# Patient Record
Sex: FEMALE | Race: WHITE
[De-identification: names, ages, dates, MRNs, and addresses within clinical notes are randomized per-mention and may not be internally consistent; named-entity substitution may affect disease eponyms.]

---

## 2021-05-04 ENCOUNTER — HOSPITAL ENCOUNTER (EMERGENCY)
Dept: HOSPITAL 56 - MW.ED | Age: 76
Discharge: HOME | End: 2021-05-04
Payer: MEDICARE

## 2021-05-04 DIAGNOSIS — Z79.899: ICD-10-CM

## 2021-05-04 DIAGNOSIS — I10: ICD-10-CM

## 2021-05-04 DIAGNOSIS — K57.32: Primary | ICD-10-CM

## 2021-05-04 DIAGNOSIS — Z79.01: ICD-10-CM

## 2021-05-04 DIAGNOSIS — Z79.82: ICD-10-CM

## 2021-05-04 LAB
BUN SERPL-MCNC: 20 MG/DL (ref 7–18)
CHLORIDE SERPL-SCNC: 100 MMOL/L (ref 98–107)
CO2 SERPL-SCNC: 28.2 MMOL/L (ref 21–32)
GLUCOSE SERPL-MCNC: 121 MG/DL (ref 74–106)
LIPASE SERPL-CCNC: 131 U/L (ref 73–393)
POTASSIUM SERPL-SCNC: 3.5 MMOL/L (ref 3.5–5.1)
SODIUM SERPL-SCNC: 139 MMOL/L (ref 136–145)

## 2021-05-04 PROCEDURE — 96374 THER/PROPH/DIAG INJ IV PUSH: CPT

## 2021-05-04 PROCEDURE — 80053 COMPREHEN METABOLIC PANEL: CPT

## 2021-05-04 PROCEDURE — 71045 X-RAY EXAM CHEST 1 VIEW: CPT

## 2021-05-04 PROCEDURE — 83690 ASSAY OF LIPASE: CPT

## 2021-05-04 PROCEDURE — 93005 ELECTROCARDIOGRAM TRACING: CPT

## 2021-05-04 PROCEDURE — 99284 EMERGENCY DEPT VISIT MOD MDM: CPT

## 2021-05-04 PROCEDURE — 84484 ASSAY OF TROPONIN QUANT: CPT

## 2021-05-04 PROCEDURE — 36415 COLL VENOUS BLD VENIPUNCTURE: CPT

## 2021-05-04 PROCEDURE — 74177 CT ABD & PELVIS W/CONTRAST: CPT

## 2021-05-04 PROCEDURE — 85025 COMPLETE CBC W/AUTO DIFF WBC: CPT

## 2021-05-04 PROCEDURE — 87086 URINE CULTURE/COLONY COUNT: CPT

## 2021-05-04 PROCEDURE — 81001 URINALYSIS AUTO W/SCOPE: CPT

## 2021-05-04 NOTE — CR
INDICATION:



Left flank and upper abdominal pain. 



COMPARISON:



None available. 



FINDINGS:



An erect single view of the chest was obtained at 20 58 hours. 



There is mild prominence of interstitial markings consistent with mild 

pulmonary fibrosis.



There is mild eventration of the right hemidiaphragm. 



There is mild bilateral apical pleural thickening. 



The lungs are otherwise clear. 



The heart is top normal in size. 



The mediastinum is normal in appearance. 



The components of a right reverse total shoulder prosthesis is in anatomic 

alignment with no sign of fracture, loosening, or dislocation. There is no 

sign of fracture of the native osseous structures.



IMPRESSION:



No active disease seen in the chest. 



Mild prominence of interstitial markings consistent with mild pulmonary 

fibrosis. 



Heart top normal in size.



Dictated by Andrea Black MD @ 5/4/2021 9:41:12 PM



Signed by Dr. Andrea Black @ May  4 2021  9:41PM

## 2021-05-04 NOTE — EDM.PDOC
ED HPI GENERAL MEDICAL PROBLEM





- General


Chief Complaint: Genitourinary Problem


Stated Complaint: LEFT SIDED ABDOMINAL PAIN


Time Seen by Provider: 05/04/21 19:32


Source of Information: Reports: Patient


History Limitations: Reports: No Limitations





- History of Present Illness


INITIAL COMMENTS - FREE TEXT/NARRATIVE: 


HISTORY AND PHYSICAL:





History of present illness:


Patient is a 75-year-old female who presents emergency room today with concern 

of left-sided abdominal pain that has been ongoing for the past several days.  

Patient states that she has had diverticulitis in the past and states that her 

pain is similar to her diverticulitis pain but is different and that also 

involves the left upper quadrant and left flank area.  Patient states that the 

pain is worse when she bends over and feels that she is unable to bend over as 

it "pinches "in her abdomen.  Patient states she has had her gallbladder removed

as well denies any other abdominal surgeries.  Patient states she has a history 

of atrial fibrillation and hypertension denies any other health history or other

abdominal surgeries.  Patient states that she has not taken anything for her 

symptoms.  Patient states that she is on Eliquis due to atrial fibrillation.





Patient denies fever, chills, chest pain, shortness of breath, or cough. Denies 

headache, neck stiff ness, change in vision, syncope, or near syncope. Denies 

nausea, vomiting  diarrhea, constipation, or dysuria. Has not noted any blood in

urine or stool. Patient has been eating and drinking appropriately.





Review of systems: 


As per history of present illness and below otherwise all systems reviewed and 

negative.





Past medical history: 


As per history of present illness and as reviewed below otherwise 

noncontributory.





Surgical history: 


As per history of present illness and as reviewed below otherwise 

noncontributory.





Social history: 


See social history for further information





Family history: 


As per history of present illness and as reviewed below otherwise 

noncontributory.





Physical exam:


General: Patient is alert, oriented, and in no acute distress. Patient sitting 

comfortably on exam table.  Vitals stable and reviewed by me.


HEENT: Atraumatic, normocephalic, pupils equal and reactive bilaterally, 

negative for conjunctival pallor or scleral icterus, mucous membranes moist, TMs

normal bilaterally, throat clear, neck supple, nontender, trachea midline. No 

drooling or trismus noted. No meningeal signs. No hot potato voice noted. 


Lungs: Clear to auscultation, breath sounds equal bilaterally, chest nontender.


Heart: S1S2, regular rate and rhythm without overt murmur


Abdomen: Soft, nondistended, moderate left upper quadrant and left lower 

quadrant tenderness without guarding, negative rebound, negative Mendez sign. 

Negative for masses or hepatosplenomegaly. Negative for costovertebral 

tenderness.


Pelvis: Stable nontender.


Genitourinary: Deferred.


Rectal: Deferred.


Skin: Intact, warm, dry. No lesions or rashes noted.


Extremities: Atraumatic, negative for cords or calf pain. Neurovascular 

unremarkable.


Neuro: Awake, alert, oriented. Cranial nerves II through XII unremarkable. 

Cerebellum unremarkable. Motor and sensory unremarkable throughout. Exam 

nonfocal.





Notes:


Upon arrival to the ED, patient is vitally stable and well-appearing on exam.  

She does have moderate left upper and left lower quadrant tenderness without 

guarding.  Patient does have a history of diverticulitis and states that the 

pain is similar in nature but also involves the left upper quadrant which is new

to her.  Will obtain lab work as well as a abdominal pelvic CT scan with 

contrast.





EKG shows atrial fibrillation with a rate of 80 with no signs of acute ischemia.

 See Dr. López's dictation for specific EKG interpretation.


CBC is unremarkable.  CMP indicates an mildly elevated BUN at 20 and creatinine 

at 1.1 which appears to be improved from patient's prior lab work on file.  

Lipase is within normal limits.  UA has 0-2 white blood cells and red blood 

cells with small leukocyte Estrace with no signs of infection.


Abdominal pelvic CT shows mild thickening of the wall of the sigmoid colon with 

minimal fat stranding.  Extensive diverticulosis.  An early diverticulitis 

cannot be excluded.  Irregularity of L2, T12, and T8 vertebral bodies.  This 

could be related to osteopenia possible hemangioma and mild compression.  Would 

also consider potential malignancy cannot be completely excluded.  Recommend 

follow-up.








Upon reevaluation of patient, she remains vitally stable and comfortable 

throughout stay in ED.


All incidental findings of imaging and lab work today discussed with patient 

importance to have this followed up with her primary care provider.  Strict 

return precautions thoroughly discussed with patient.  Discussed importance for 

follow-up with a primary care provider.


Voices understanding and is agreeable to plan of care. Denies any further 

questions or concerns at this time.





Diagnostics:


CBC, CMP, UA, lipase, chest x-ray, troponin, EKG, abdominal pelvic CT scan with 

contrast





Therapeutics:


Saline lock, Morphine





Prescription:


Flagyl, Ciprofloxacin





Impression: 


Diverticulitis





Plan:


1.  Take medication as prescribed.  You can use Tylenol as directed for pain and

discomfort.


2.  Follow-up with a primary care provider as discussed.  Return to the ED as 

needed and as discussed.





Definitive disposition and diagnosis as appropriate pending reevaluation and 

review of above.





  ** Left Flank


Pain Score (Numeric/FACES): 8





- Related Data


                                    Allergies











Allergy/AdvReac Type Severity Reaction Status Date / Time


 


No Known Allergies Allergy   Verified 05/04/21 19:15











Home Meds: 


                                    Home Meds





Apixaban [Eliquis] 5 mg PO BID 09/04/18 [History]


Lisinopril 40 mg PO DAILY 09/04/18 [History]


Metoprolol Succinate [Toprol Xl] 100 mg PO DAILY 09/04/18 [History]


hydroCHLOROthiazide [Microzide] 25 mg PO DAILY 09/04/18 [History]


Aspirin 1 tab PO DAILY 05/04/21 [History]











Past Medical History


HEENT History: Reports: Impaired Vision


Cardiovascular History: Reports: Arrhythmia, Hypertension


Gastrointestinal History: Reports: Diverticulosis


Hematologic History: Reports: Anticoagulation Therapy


Oncologic (Cancer) History: Reports: Breast





- Infectious Disease History


Infectious Disease History: Reports: None





- Past Surgical History


GI Surgical History: Reports: Colonoscopy


Female  Surgical History: Reports: Breast Biopsy, Mastectomy


Musculoskeletal Surgical History: Reports: Knee Replacement, Shoulder Surgery





Social & Family History





- Family History


Family Medical History: No Pertinent Family History





- Tobacco Use


Tobacco Use Status *Q: Never Tobacco User





- Caffeine Use


Caffeine Use: Reports: Coffee





- Recreational Drug Use


Recreational Drug Use: No





ED ROS GENERAL





- Review of Systems


Review Of Systems: Comprehensive ROS is negative, except as noted in HPI.





ED EXAM, GENERAL





- Physical Exam


Exam: See Below (see dictation)





Course





- Vital Signs


Last Recorded V/S: 


                                Last Vital Signs











Temp  96.5 F L  05/04/21 19:17


 


Pulse  78   05/04/21 22:57


 


Resp  18   05/04/21 22:57


 


BP  173/88 H  05/04/21 22:57


 


Pulse Ox  97   05/04/21 22:57














- Orders/Labs/Meds


Labs: 


                                Laboratory Tests











  05/04/21 05/04/21 05/04/21 Range/Units





  19:32 19:51 19:51 


 


WBC   7.19   (4.0-11.0)  K/uL


 


RBC   4.57   (4.30-5.90)  M/uL


 


Hgb   13.6   (12.0-16.0)  g/dL


 


Hct   40.6   (36.0-46.0)  %


 


MCV   88.8   (80.0-98.0)  fL


 


MCH   29.8   (27.0-32.0)  pg


 


MCHC   33.5   (31.0-37.0)  g/dL


 


RDW Std Deviation   49.0   (28.0-62.0)  fl


 


RDW Coeff of Kristine   15   (11.0-15.0)  %


 


Plt Count   263   (150-400)  K/uL


 


MPV   9.00   (7.40-12.00)  fL


 


Neut % (Auto)   67.7   (48.0-80.0)  %


 


Lymph % (Auto)   18.6   (16.0-40.0)  %


 


Mono % (Auto)   11.8   (0.0-15.0)  %


 


Eos % (Auto)   1.5   (0.0-7.0)  %


 


Baso % (Auto)   0.4   (0.0-1.5)  %


 


Neut # (Auto)   4.9   (1.4-5.7)  K/uL


 


Lymph # (Auto)   1.3   (0.6-2.4)  K/uL


 


Mono # (Auto)   0.9 H   (0.0-0.8)  K/uL


 


Eos # (Auto)   0.1   (0.0-0.7)  K/uL


 


Baso # (Auto)   0.0   (0.0-0.1)  K/uL


 


Nucleated RBC %   0.0   /100WBC


 


Nucleated RBCs #   0   K/uL


 


Sodium    139  (136-145)  mmol/L


 


Potassium    3.5  (3.5-5.1)  mmol/L


 


Chloride    100  ()  mmol/L


 


Carbon Dioxide    28.2  (21.0-32.0)  mmol/L


 


BUN    20 H  (7.0-18.0)  mg/dL


 


Creatinine    1.1 H  (0.6-1.0)  mg/dL


 


Est Cr Clr Drug Dosing    35.43  mL/min


 


Estimated GFR (MDRD)    48.2  ml/min


 


Glucose    121 H  ()  mg/dL


 


Calcium    9.0  (8.5-10.1)  mg/dL


 


Total Bilirubin    0.4  (0.2-1.0)  mg/dL


 


AST    20  (15-37)  IU/L


 


ALT    25  (14-63)  IU/L


 


Alkaline Phosphatase    95  ()  U/L


 


Troponin I    < 0.050  (0.000-0.056)  ng/mL


 


Total Protein    8.6 H  (6.4-8.2)  g/dL


 


Albumin    4.0  (3.4-5.0)  g/dL


 


Globulin    4.6 H  (2.6-4.0)  g/dL


 


Albumin/Globulin Ratio    0.9  (0.9-1.6)  


 


Lipase    131  ()  U/L


 


Urine Color  YELLOW    


 


Urine Appearance  HAZY    


 


Urine pH  7.5    (5.0-8.0)  


 


Ur Specific Gravity  1.010    (1.001-1.035)  


 


Urine Protein  NEGATIVE    (NEGATIVE)  mg/dL


 


Urine Glucose (UA)  NEGATIVE    (NEGATIVE)  mg/dL


 


Urine Ketones  NEGATIVE    (NEGATIVE)  mg/dL


 


Urine Occult Blood  TRACE-INTACT H    (NEGATIVE)  


 


Urine Nitrite  NEGATIVE    (NEGATIVE)  


 


Urine Bilirubin  NEGATIVE    (NEGATIVE)  


 


Urine Urobilinogen  0.2    (<2.0)  EU/dL


 


Ur Leukocyte Esterase  SMALL H    (NEGATIVE)  


 


Urine RBC  0-2    (0-2/HPF)  


 


Urine WBC  0-2    (0-5/HPF)  


 


Ur Epithelial Cells  RARE    (NONE-FEW)  


 


Urine Bacteria  FEW    (NEGATIVE)  











Meds: 


Medications














Discontinued Medications














Generic Name Dose Route Start Last Admin





  Trade Name Freq  PRN Reason Stop Dose Admin


 


Iopamidol  75 ml  05/04/21 21:02  05/04/21 21:04





  Iopamidol 755 Mg/Ml 500 Ml Multipack Bottle  IVPUSH  05/04/21 21:03  75 ml





  ONETIME STA   Administration


 


Morphine Sulfate  2 mg  05/04/21 19:55  05/04/21 20:05





  Morphine 2 Mg/Ml Syringe  IVPUSH  05/04/21 19:56  2 mg





  ONETIME ONE   Administration


 


Sodium Chloride  10 ml  05/04/21 19:45  05/04/21 19:52





  Sodium Chloride 0.9% 10 Ml Syringe  FLUSH   10 ml





  ASDIRECTED PRN   Administration





  Keep Vein Open  


 


Sodium Chloride  2.5 ml  05/04/21 19:45  05/04/21 19:52





  Sodium Chloride 0.9% 2.5 Ml Syringe  FLUSH   2.5 ml





  ASDIRECTED PRN   Administration





  Keep Vein Open  














Departure





- Departure


Time of Disposition: 22:45


Disposition: Home, Self-Care 01


Clinical Impression: 


 Diverticulitis








- Discharge Information


Referrals: 


Desmond Schuster MD [Primary Care Provider] - 


Forms:  ED Department Discharge


Additional Instructions: 


The following information is given to patients seen in the emergency department 

who are being discharged to home. This information is to outline your options 

for follow-up care. We provide all patients seen in our emergency department 

with a follow-up referral.





The need for follow-up, as well as the timing and circumstances, are variable 

depending upon the specifics of your emergency department visit.





If you don't have a primary care physician on staff, we will provide you with a 

referral. We always advise you to contact your personal physician following an 

emergency department visit to inform them of the circumstance of the visit and 

for follow-up with them and/or the need for any referrals to a consulting 

specialist.





The emergency department will also refer you to a specialist when appropriate. 

This referral assures that you have the opportunity for follow-up care with a 

specialist. All of these measure are taken in an effort to provide you with 

optimal care, which includes your follow-up.





Under all circumstances we always encourage you to contact your private 

physician who remains a resource for coordinating your care. When calling for 

follow-up care, please make the office aware that this follow-up is from your 

recent emergency room visit. If for any reason you are refused follow-up, please

contact the [facility] Emergency Department

at [phone] and asked to speak to the emergency department charge nurse.





[facility]


Primary Care


[address] [place] [id number]


Phone: [phone]


Fax: [phone]





[address] [address]


Phone: [phone]


Fax: [phone]





1.  Take medication as prescribed.  You can use Tylenol as directed for pain and

discomfort.


2.  Follow-up with a primary care provider as discussed in regards to imaging 

today and follow up as discussed.  Return to the ED as needed and as discussed.








 











Sepsis Event Note (ED)





- Evaluation


Sepsis Screening Result: No Definite Risk

## 2021-05-04 NOTE — PCM.EKG
** #1 Interpretation


EKG Date: 05/04/21


Time: 21:44


EKG Interpretation Comments: 


A. fib with a rate of 80, mild left axis deviation, no acute ischemia.

## 2021-05-04 NOTE — CT
Indication:



Left lower quadrant pain with history of diverticulitis.



Technique:



Multiple contiguous axial images were obtained from the lung bases through 

the symphysis pubis after the intravenous administration of 75 cc Isovue 

370.



Please note that all CT scans at this facility use dose modulation, 

iterative reconstruction, and/or weight-based dosing when appropriate to 

reduce radiation dose to as low as reasonably achievable. 



Comparison:



None



Findings:



Mild emphysematous changes the lungs are identified. No infiltrate, pleural 

effusion, or pneumothorax is identified. Coronary artery calcifications are 

identified.



Postsurgical changes of a cholecystectomy are identified. No intrahepatic 

biliary ductal dilatation is identified. The pancreas, adrenals, and 

kidneys are normal. A right renal cyst is identified. This measures 2.5 

centimeters in size.



In the pelvis, the urinary bladder is normal. The uterus is grossly normal.



Numerous sigmoid diverticula are identified. Mild thickening of the wall of 

the sigmoid colon is identified. Minimal fat stranding is identified 

adjacent to the sigmoid colon. This can be seen with a very early 

diverticulitis. No free air is identified. No abscess is identified. No 

free fluid is identified within the abdomen or pelvis. A small fat 

containing umbilical hernia is identified. The aorta is normal in caliber. 

Degenerative changes of the spine are identified.  Irregular appearance of 

the L2 T12 and T8 vertebral bodies are identified. The may all be related 

to mild compression, osteopenia, and potential hemangiomas. However, the 

appearance is different than the remainder of the spine at. A skeletal 

metastases cannot be completely excluded. 



Impression:



Mild thickening of the wall of the sigmoid colon with minimal fat 

stranding. Extensive diverticulosis is identified. An early diverticulitis 

cannot be excluded. 



Irregularity of L2, T12, and T8 vertebral bodies. This may be related to 

osteopenia, possible hemangiomas, and mild compression. However, other 

etiologies, including potential malignancy cannot be completely excluded. 

Follow-up should be performed.



These findings were discussed with Juju Chu at the time of this 

dictation.



Please note that all CT scans at this facility use dose modulation, 

iterative reconstruction, and/or weight-based dosing when appropriate to 

reduce radiation dose to as low as reasonably achievable.



Dictated by Reina Sandoval MD @ 5/4/2021 9:47:43 PM



Signed by Dr. Reina Sandoval @ May  4 2021  9:47PM

## 2021-05-09 ENCOUNTER — HOSPITAL ENCOUNTER (EMERGENCY)
Dept: HOSPITAL 56 - MW.ED | Age: 76
Discharge: HOME | End: 2021-05-09
Payer: MEDICARE

## 2021-05-09 DIAGNOSIS — M54.6: Primary | ICD-10-CM

## 2021-05-09 DIAGNOSIS — Z79.01: ICD-10-CM

## 2021-05-09 DIAGNOSIS — Z79.899: ICD-10-CM

## 2021-05-09 DIAGNOSIS — I10: ICD-10-CM

## 2021-05-09 DIAGNOSIS — Z79.82: ICD-10-CM

## 2021-05-09 PROCEDURE — 99283 EMERGENCY DEPT VISIT LOW MDM: CPT

## 2021-05-09 PROCEDURE — 96372 THER/PROPH/DIAG INJ SC/IM: CPT

## 2021-05-09 NOTE — EDM.PDOC
ED HPI GENERAL MEDICAL PROBLEM





- General


Chief Complaint: Back Pain or Injury


Stated Complaint: BACK SPASM, PAIN IN BACK


Time Seen by Provider: 05/09/21 11:33


Source of Information: Reports: Patient


History Limitations: Reports: No Limitations





- History of Present Illness


INITIAL COMMENTS - FREE TEXT/NARRATIVE: 





Patient is a 75-year-old female who presents today for mid back pain.  Patient 

was seen here few days ago for diverticulitis and treated with antibiotics 

states abdominal pain is better as well as her diarrhea.  Patient states that on

the CT scan it showed a possible compression fracture and she is scheduled to 

have MRI this Friday.  Patient has no leg numbness or weakness no urinary 

incontinence or saddle anesthesia.  Patient denies any neurological symptoms.  O

n exam patient pain is around thoracic area towards right side is reproducible 

with palpation.  Patient that she has had pain has had hard time laying flat or 

sleeping.  Patient otherwise denies any other medical complaints.


  ** back


Pain Score (Numeric/FACES): 9





- Related Data


                                    Allergies











Allergy/AdvReac Type Severity Reaction Status Date / Time


 


No Known Allergies Allergy   Verified 05/09/21 11:54











Home Meds: 


                                    Home Meds





Apixaban [Eliquis] 5 mg PO BID 09/04/18 [History]


Lisinopril 40 mg PO DAILY 09/04/18 [History]


Metoprolol Succinate [Toprol Xl] 100 mg PO DAILY 09/04/18 [History]


hydroCHLOROthiazide [Microzide] 25 mg PO DAILY 09/04/18 [History]


Aspirin 1 tab PO DAILY 05/04/21 [History]











Past Medical History


HEENT History: Reports: Impaired Vision


Cardiovascular History: Reports: Arrhythmia, Hypertension


Gastrointestinal History: Reports: Diverticulosis


Hematologic History: Reports: Anticoagulation Therapy


Oncologic (Cancer) History: Reports: Breast





- Infectious Disease History


Infectious Disease History: Reports: None





- Past Surgical History


GI Surgical History: Reports: Colonoscopy


Female  Surgical History: Reports: Breast Biopsy, Mastectomy


Musculoskeletal Surgical History: Reports: Knee Replacement, Shoulder Surgery





Social & Family History





- Family History


Family Medical History: No Pertinent Family History





- Caffeine Use


Caffeine Use: Reports: Coffee





ED ROS GENERAL





- Review of Systems


Review Of Systems: See Below


Constitutional: Reports: No Symptoms


HEENT: Reports: No Symptoms


Respiratory: Reports: No Symptoms


Cardiovascular: Reports: No Symptoms


Endocrine: Reports: No Symptoms


GI/Abdominal: Reports: No Symptoms


: Reports: No Symptoms


Musculoskeletal: Reports: Back Pain


Skin: Reports: No Symptoms


Neurological: Reports: No Symptoms


Psychiatric: Reports: No Symptoms


Hematologic/Lymphatic: Reports: No Symptoms


Immunologic: Reports: No Symptoms





ED EXAM, GI/ABD





- Physical Exam


Exam: See Below


Exam Limited By: No Limitations


General Appearance: Alert, WD/WN, No Apparent Distress


Respiratory/Chest: No Respiratory Distress


Cardiovascular: Normal Peripheral Pulses


GI/Abdominal Exam: Normal Bowel Sounds, Soft, Non-Tender


Extremities: Normal Inspection, Normal Range of Motion


Neurological: Alert, Oriented, Normal Cognition, Normal Gait





Course





- Vital Signs


Last Recorded V/S: 


                                Last Vital Signs











Temp  96.1 F L  05/09/21 11:37


 


Pulse  74   05/09/21 11:37


 


Resp  18   05/09/21 11:37


 


BP  191/111 H  05/09/21 11:37


 


Pulse Ox  97   05/09/21 11:37














- Orders/Labs/Meds


Meds: 


Medications














Discontinued Medications














Generic Name Dose Route Start Last Admin





  Trade Name Freq  PRN Reason Stop Dose Admin


 


Ketorolac Tromethamine  30 mg  05/09/21 12:06  05/09/21 12:09





  Ketorolac 30 Mg/Ml Sdv  IM  05/09/21 12:07  30 mg





  ONETIME ONE   Administration


 


Lidocaine  700 mg  05/09/21 12:01  05/09/21 12:06





  Lidocaine 5% 700 Mg Patch  TOP  05/09/21 12:02  700 mg





  ONETIME ONE   Administration














Departure





- Departure


Time of Disposition: 12:48


Disposition: Home, Self-Care 01


Condition: Good


Clinical Impression: 


 Back pain








- Discharge Information


*PRESCRIPTION DRUG MONITORING PROGRAM REVIEWED*: Not Applicable


*COPY OF PRESCRIPTION DRUG MONITORING REPORT IN PATIENT ANGY: Not Applicable


Instructions:  Muscle Strain, Easy-to-Read, Pain Medicine Instructions, 

Easy-to-Read


Referrals: 


PCP,None [Primary Care Provider] - 


Forms:  ED Department Discharge


Additional Instructions: 


The following information is given to patients seen in the emergency department 

who are being discharged to home. This information is to outline your options 

for follow-up care. We provide all patients seen in our emergency department 

with a follow-up referral.





The need for follow-up, as well as the timing and circumstances, are variable 

depending upon the specifics of your emergency department visit.





If you don't have a primary care physician on staff, we will provide you with a 

referral. We always advise you to contact your personal physician following an 

emergency department visit to inform them of the circumstance of the visit and 

for follow-up with them and/or the need for any referrals to a consulting 

specialist.





The emergency department will also refer you to a specialist when appropriate. 

This referral assures that you have the opportunity for follow-up care with a 

specialist. All of these measure are taken in an effort to provide you with 

optimal care, which includes your follow-up.





Under all circumstances we always encourage you to contact your private 

physician who remains a resource for coordinating your care. When calling for 

follow-up care, please make the office aware that this follow-up is from your 

recent emergency room visit. If for any reason you are refused follow-up, please

contact the [facility] Emergency Department

at [phone] and asked to speak to the emergency department charge nurse.





Please follow up with your primary care physician. If you do not have a primary 

care physician, see below:


[facility]


[address] [place] [id number] [phone]





[facility]


[address] [place] [id number] [phone]








You were seen today for back pain.  As you are told last time you may have a 

compression fracture in your spine and you are scheduled see your doctor have 

MRI this Friday.  You had no neurological symptoms on exam and we were able to 

treat your pain with lidocaine and a Toradol injection.  We can send you home 

with a lidocaine patch and additional pain medications.  If you develop any 

neurological symptoms such as numbness weakness to your lower extremities lose 

control your bladder please return to the ED.





Sepsis Event Note (ED)





- Evaluation


Sepsis Screening Result: No Definite Risk





- Focused Exam


Vital Signs: 


                                   Vital Signs











  Temp Pulse Resp BP Pulse Ox


 


 05/09/21 11:37  96.1 F L  74  18  191/111 H  97














- Assessment/Plan


Plan: 





Patient 75-year-old female presents today for back pain.  Patient told she had a

previous compression fracture on CT scan abdomen pelvis.  Patient has no 

neurological complaints no abdominal pain.  Patient will be given pain control 

for the back pain and reassess.